# Patient Record
Sex: FEMALE | Employment: UNEMPLOYED | URBAN - METROPOLITAN AREA
[De-identification: names, ages, dates, MRNs, and addresses within clinical notes are randomized per-mention and may not be internally consistent; named-entity substitution may affect disease eponyms.]

---

## 2019-01-16 PROBLEM — Z78.9 LANGUAGE BARRIER: Status: ACTIVE | Noted: 2019-01-16

## 2019-01-16 PROBLEM — Z34.90 PREGNANCY: Status: ACTIVE | Noted: 2019-01-16

## 2019-01-16 PROBLEM — D27.1 DERMOID CYST OF LEFT OVARY: Status: ACTIVE | Noted: 2019-01-16

## 2019-08-26 ENCOUNTER — HOSPITAL ENCOUNTER (INPATIENT)
Age: 24
LOS: 2 days | Discharge: HOME | DRG: 560 | End: 2019-08-28
Attending: OBSTETRICS & GYNECOLOGY | Admitting: OBSTETRICS & GYNECOLOGY
Payer: MEDICAID

## 2019-08-26 ENCOUNTER — ANESTHESIA EVENT (OUTPATIENT)
Dept: LABOR AND DELIVERY | Age: 24
DRG: 560 | End: 2019-08-26
Payer: MEDICAID

## 2019-08-26 ENCOUNTER — ANESTHESIA (OUTPATIENT)
Dept: LABOR AND DELIVERY | Age: 24
DRG: 560 | End: 2019-08-26
Payer: MEDICAID

## 2019-08-26 DIAGNOSIS — Z3A.38 38 WEEKS GESTATION OF PREGNANCY: Primary | ICD-10-CM

## 2019-08-26 PROBLEM — Z37.9 NORMAL LABOR: Status: ACTIVE | Noted: 2019-08-26

## 2019-08-26 PROBLEM — O42.90 AMNIOTIC FLUID LEAKING: Status: ACTIVE | Noted: 2019-08-26

## 2019-08-26 LAB
A1 MICROGLOB PLACENTAL VAG QL: POSITIVE
ABO + RH BLD: NORMAL
ARTERIAL PATENCY WRIST A: ABNORMAL
ARTERIAL PATENCY WRIST A: ABNORMAL
BASE DEFICIT BLD-SCNC: 2 MMOL/L
BASE DEFICIT BLD-SCNC: 3 MMOL/L
BDY SITE: ABNORMAL
BDY SITE: ABNORMAL
BLOOD GROUP ANTIBODIES SERPL: NORMAL
BODY TEMPERATURE: 98.6
BODY TEMPERATURE: 98.6
CO2 BLD-SCNC: 24 MMOL/L
CO2 BLD-SCNC: 26 MMOL/L
COLLECT TIME,HTIME: 1855
COLLECT TIME,HTIME: 1855
CONTROL LINE PRESENT?: NORMAL
ERYTHROCYTE [DISTWIDTH] IN BLOOD BY AUTOMATED COUNT: 15.5 % (ref 11.9–14.6)
EXPIRATION DATE: NORMAL
GAS FLOW.O2 O2 DELIVERY SYS: ABNORMAL L/MIN
GAS FLOW.O2 O2 DELIVERY SYS: ABNORMAL L/MIN
HCO3 BLD-SCNC: 24.7 MMOL/L (ref 22–26)
HCO3 BLDV-SCNC: 22.5 MMOL/L (ref 23–28)
HCT VFR BLD AUTO: 35.3 % (ref 35.8–46.3)
HGB BLD-MCNC: 11.2 G/DL (ref 11.7–15.4)
INTERNAL NEGATIVE CONTROL: NORMAL
KIT LOT NO.: NORMAL
MCH RBC QN AUTO: 26.8 PG (ref 26.1–32.9)
MCHC RBC AUTO-ENTMCNC: 31.7 G/DL (ref 31.4–35)
MCV RBC AUTO: 84.4 FL (ref 79.6–97.8)
NRBC # BLD: 0 K/UL (ref 0–0.2)
PCO2 BLDCO: 39 MMHG (ref 32–68)
PCO2 BLDCO: 48 MMHG (ref 32–68)
PH BLDCO: 7.32 [PH] (ref 7.15–7.38)
PH BLDCO: 7.37 [PH] (ref 7.15–7.38)
PLATELET # BLD AUTO: 349 K/UL (ref 150–450)
PMV BLD AUTO: 10.3 FL (ref 9.4–12.3)
PO2 BLDCO: 19 MMHG
PO2 BLDCO: 30 MMHG
RBC # BLD AUTO: 4.18 M/UL (ref 4.05–5.2)
SAO2 % BLD: 25 % (ref 95–98)
SAO2 % BLDV: 55 % (ref 65–88)
SERVICE CMNT-IMP: ABNORMAL
SERVICE CMNT-IMP: ABNORMAL
SPECIMEN EXP DATE BLD: NORMAL
SPECIMEN TYPE: ABNORMAL
SPECIMEN TYPE: ABNORMAL
WBC # BLD AUTO: 8.4 K/UL (ref 4.3–11.1)

## 2019-08-26 PROCEDURE — 77030034696 HC CATH URETH FOL 2W BARD -A

## 2019-08-26 PROCEDURE — 0UQMXZZ REPAIR VULVA, EXTERNAL APPROACH: ICD-10-PCS | Performed by: OBSTETRICS & GYNECOLOGY

## 2019-08-26 PROCEDURE — 74011250636 HC RX REV CODE- 250/636: Performed by: OBSTETRICS & GYNECOLOGY

## 2019-08-26 PROCEDURE — 59025 FETAL NON-STRESS TEST: CPT

## 2019-08-26 PROCEDURE — 0KQM0ZZ REPAIR PERINEUM MUSCLE, OPEN APPROACH: ICD-10-PCS | Performed by: OBSTETRICS & GYNECOLOGY

## 2019-08-26 PROCEDURE — 76060000078 HC EPIDURAL ANESTHESIA

## 2019-08-26 PROCEDURE — 77030011945 HC CATH URIN INT ST MENT -A

## 2019-08-26 PROCEDURE — 99283 EMERGENCY DEPT VISIT LOW MDM: CPT

## 2019-08-26 PROCEDURE — 74011250637 HC RX REV CODE- 250/637: Performed by: OBSTETRICS & GYNECOLOGY

## 2019-08-26 PROCEDURE — 75410000003 HC RECOV DEL/VAG/CSECN EA 0.5 HR

## 2019-08-26 PROCEDURE — 77030014125 HC TY EPDRL BBMI -B: Performed by: STUDENT IN AN ORGANIZED HEALTH CARE EDUCATION/TRAINING PROGRAM

## 2019-08-26 PROCEDURE — 82803 BLOOD GASES ANY COMBINATION: CPT

## 2019-08-26 PROCEDURE — 77030020255 HC SOL INJ LR 1000ML BG

## 2019-08-26 PROCEDURE — 75410000002 HC LABOR FEE PER 1 HR

## 2019-08-26 PROCEDURE — 86900 BLOOD TYPING SEROLOGIC ABO: CPT

## 2019-08-26 PROCEDURE — 74011250636 HC RX REV CODE- 250/636

## 2019-08-26 PROCEDURE — 84112 EVAL AMNIOTIC FLUID PROTEIN: CPT | Performed by: OBSTETRICS & GYNECOLOGY

## 2019-08-26 PROCEDURE — 77030018846 HC SOL IRR STRL H20 ICUM -A

## 2019-08-26 PROCEDURE — 75410000000 HC DELIVERY VAGINAL/SINGLE

## 2019-08-26 PROCEDURE — 85027 COMPLETE CBC AUTOMATED: CPT

## 2019-08-26 PROCEDURE — 65270000029 HC RM PRIVATE

## 2019-08-26 PROCEDURE — 4A1HXCZ MONITORING OF PRODUCTS OF CONCEPTION, CARDIAC RATE, EXTERNAL APPROACH: ICD-10-PCS | Performed by: OBSTETRICS & GYNECOLOGY

## 2019-08-26 PROCEDURE — A4300 CATH IMPL VASC ACCESS PORTAL: HCPCS | Performed by: STUDENT IN AN ORGANIZED HEALTH CARE EDUCATION/TRAINING PROGRAM

## 2019-08-26 PROCEDURE — 77030003028 HC SUT VCRL J&J -A

## 2019-08-26 RX ORDER — HYDROMORPHONE HYDROCHLORIDE 1 MG/ML
1-2 INJECTION, SOLUTION INTRAMUSCULAR; INTRAVENOUS; SUBCUTANEOUS
Status: DISCONTINUED | OUTPATIENT
Start: 2019-08-26 | End: 2019-08-28 | Stop reason: HOSPADM

## 2019-08-26 RX ORDER — NALOXONE HYDROCHLORIDE 0.4 MG/ML
0.4 INJECTION, SOLUTION INTRAMUSCULAR; INTRAVENOUS; SUBCUTANEOUS AS NEEDED
Status: DISCONTINUED | OUTPATIENT
Start: 2019-08-26 | End: 2019-08-28 | Stop reason: HOSPADM

## 2019-08-26 RX ORDER — HYDROCODONE BITARTRATE AND ACETAMINOPHEN 7.5; 325 MG/1; MG/1
1-2 TABLET ORAL
Status: DISCONTINUED | OUTPATIENT
Start: 2019-08-26 | End: 2019-08-28 | Stop reason: HOSPADM

## 2019-08-26 RX ORDER — OXYTOCIN/RINGER'S LACTATE 30/500 ML
0-25 PLASTIC BAG, INJECTION (ML) INTRAVENOUS
Status: DISCONTINUED | OUTPATIENT
Start: 2019-08-26 | End: 2019-08-26

## 2019-08-26 RX ORDER — SODIUM CHLORIDE 0.9 % (FLUSH) 0.9 %
5-40 SYRINGE (ML) INJECTION AS NEEDED
Status: DISCONTINUED | OUTPATIENT
Start: 2019-08-26 | End: 2019-08-26

## 2019-08-26 RX ORDER — LIDOCAINE HYDROCHLORIDE 10 MG/ML
1 INJECTION INFILTRATION; PERINEURAL
Status: DISCONTINUED | OUTPATIENT
Start: 2019-08-26 | End: 2019-08-26

## 2019-08-26 RX ORDER — ROPIVACAINE HYDROCHLORIDE 2 MG/ML
INJECTION, SOLUTION EPIDURAL; INFILTRATION; PERINEURAL
Status: DISCONTINUED | OUTPATIENT
Start: 2019-08-26 | End: 2019-08-26 | Stop reason: HOSPADM

## 2019-08-26 RX ORDER — LANOLIN ALCOHOL/MO/W.PET/CERES
1 CREAM (GRAM) TOPICAL
Status: DISCONTINUED | OUTPATIENT
Start: 2019-08-27 | End: 2019-08-28 | Stop reason: HOSPADM

## 2019-08-26 RX ORDER — ZOLPIDEM TARTRATE 5 MG/1
5 TABLET ORAL
Status: DISCONTINUED | OUTPATIENT
Start: 2019-08-26 | End: 2019-08-28 | Stop reason: HOSPADM

## 2019-08-26 RX ORDER — SODIUM CHLORIDE 0.9 % (FLUSH) 0.9 %
5-40 SYRINGE (ML) INJECTION EVERY 8 HOURS
Status: DISCONTINUED | OUTPATIENT
Start: 2019-08-26 | End: 2019-08-26

## 2019-08-26 RX ORDER — ROPIVACAINE HYDROCHLORIDE 5 MG/ML
INJECTION, SOLUTION EPIDURAL; INFILTRATION; PERINEURAL AS NEEDED
Status: DISCONTINUED | OUTPATIENT
Start: 2019-08-26 | End: 2019-08-26 | Stop reason: HOSPADM

## 2019-08-26 RX ORDER — LIDOCAINE HYDROCHLORIDE 20 MG/ML
JELLY TOPICAL
Status: DISCONTINUED | OUTPATIENT
Start: 2019-08-26 | End: 2019-08-26

## 2019-08-26 RX ORDER — DEXTROSE, SODIUM CHLORIDE, SODIUM LACTATE, POTASSIUM CHLORIDE, AND CALCIUM CHLORIDE 5; .6; .31; .03; .02 G/100ML; G/100ML; G/100ML; G/100ML; G/100ML
125 INJECTION, SOLUTION INTRAVENOUS CONTINUOUS
Status: DISCONTINUED | OUTPATIENT
Start: 2019-08-26 | End: 2019-08-26

## 2019-08-26 RX ORDER — OXYTOCIN/RINGER'S LACTATE 15/250 ML
250 PLASTIC BAG, INJECTION (ML) INTRAVENOUS ONCE
Status: DISCONTINUED | OUTPATIENT
Start: 2019-08-26 | End: 2019-08-26

## 2019-08-26 RX ORDER — MINERAL OIL
120 OIL (ML) ORAL
Status: COMPLETED | OUTPATIENT
Start: 2019-08-26 | End: 2019-08-26

## 2019-08-26 RX ORDER — DIPHENHYDRAMINE HCL 25 MG
25 CAPSULE ORAL
Status: DISCONTINUED | OUTPATIENT
Start: 2019-08-26 | End: 2019-08-28 | Stop reason: HOSPADM

## 2019-08-26 RX ORDER — BUTORPHANOL TARTRATE 2 MG/ML
1 INJECTION INTRAMUSCULAR; INTRAVENOUS
Status: DISCONTINUED | OUTPATIENT
Start: 2019-08-26 | End: 2019-08-26

## 2019-08-26 RX ORDER — SIMETHICONE 80 MG
80 TABLET,CHEWABLE ORAL
Status: DISCONTINUED | OUTPATIENT
Start: 2019-08-26 | End: 2019-08-28 | Stop reason: HOSPADM

## 2019-08-26 RX ORDER — IBUPROFEN 800 MG/1
800 TABLET ORAL
Status: DISCONTINUED | OUTPATIENT
Start: 2019-08-26 | End: 2019-08-28 | Stop reason: HOSPADM

## 2019-08-26 RX ORDER — METHYLERGONOVINE MALEATE 0.2 MG/1
200 TABLET ORAL EVERY 6 HOURS
Status: COMPLETED | OUTPATIENT
Start: 2019-08-27 | End: 2019-08-27

## 2019-08-26 RX ADMIN — METHYLERGONOVINE MALEATE 200 MCG: 0.2 TABLET ORAL at 23:27

## 2019-08-26 RX ADMIN — IBUPROFEN 800 MG: 800 TABLET, FILM COATED ORAL at 21:35

## 2019-08-26 RX ADMIN — ROPIVACAINE HYDROCHLORIDE 13 ML: 5 INJECTION, SOLUTION EPIDURAL; INFILTRATION; PERINEURAL at 15:12

## 2019-08-26 RX ADMIN — SODIUM CHLORIDE, SODIUM LACTATE, POTASSIUM CHLORIDE, CALCIUM CHLORIDE, AND DEXTROSE MONOHYDRATE 125 ML/HR: 600; 310; 30; 20; 5 INJECTION, SOLUTION INTRAVENOUS at 11:00

## 2019-08-26 RX ADMIN — OXYTOCIN 2 MILLI-UNITS/MIN: 10 INJECTION, SOLUTION INTRAMUSCULAR; INTRAVENOUS at 11:40

## 2019-08-26 RX ADMIN — SODIUM CHLORIDE, SODIUM LACTATE, POTASSIUM CHLORIDE, AND CALCIUM CHLORIDE 500 ML: 600; 310; 30; 20 INJECTION, SOLUTION INTRAVENOUS at 15:32

## 2019-08-26 RX ADMIN — ROPIVACAINE HYDROCHLORIDE 8 ML/HR: 2 INJECTION, SOLUTION EPIDURAL; INFILTRATION; PERINEURAL at 15:19

## 2019-08-26 RX ADMIN — MINERAL OIL 120 ML: 471.95 OIL ORAL at 18:29

## 2019-08-26 RX ADMIN — WITCH HAZEL 1 PAD: 500 SOLUTION RECTAL; TOPICAL at 21:35

## 2019-08-26 NOTE — PROGRESS NOTES
1838 Pushing with pt.   1850 Called out for delivery staff. 1851 perineum  prep completed. 200  female apgars 9&9. Infant skin to skin to mom.    Report given to Vic Naranjo.

## 2019-08-26 NOTE — PROGRESS NOTES
1004 Pt. Arrived for rule out rupture. Pt. States her water broke at 0900. Pt. States fluid was clear. Pt. States she is toño but she does not know how often. EFM and toco applied. Dr. Tania Mancuso called per RN. Amnisure collected. 26 Dr. Tania Mancuso at bedside reviewing FHR tracing. 1027 Amnisure positive. SVE per Dr. Tania Mancuso. 1/50/-2. Admission orders received. 1038 Pt. Transferred to L&D via wheelchair with RN.  Report given to Fred Duvall Rn

## 2019-08-26 NOTE — PROGRESS NOTES
1805 Pitocin decreased by half per MAR. MD at [facility]. Made aware. Strip reviewed.

## 2019-08-26 NOTE — PROGRESS NOTES
Dr. Aditya Arreola at bedside. SVE as documented. FHR and contraction pattern reviewed. Vásquez placed per MD. Continue pitocin.

## 2019-08-26 NOTE — PROGRESS NOTES
Patient seen and examined. Comfortable with epidural.  5/90/-1/cephalic. Somewhat molded head but no swelling. Continue pitocin.

## 2019-08-26 NOTE — PROGRESS NOTES
213 Endeavour Thomas to room. Pt to side of bed.   1507 Anesthesia MD in room. 1508 Time out per MD.   1513 Epidural cath in place. Test dose. Serial BP as documented. 1516 Pt to left hip tilt.

## 2019-08-26 NOTE — ANESTHESIA PROCEDURE NOTES
Epidural Block    Start time: 8/26/2019 3:05 PM  End time: 8/26/2019 3:13 PM  Performed by: Eulalia Dominguez MD  Authorized by: Eulalia Dominguez MD     Pre-Procedure  Indication: at surgeon's request and labor epidural    Preanesthetic Checklist: patient identified, risks and benefits discussed, anesthesia consent, site marked, patient being monitored, timeout performed and anesthesia consent    Timeout Time: 15:08        Epidural:   Patient position:  Seated  Prep region:  Lumbar  Prep: Chlorhexidine    Location:  L3-4    Needle and Epidural Catheter:   Needle Type:  Tuohy  Needle Gauge:  17 G  Injection Technique:  Loss of resistance using saline  Attempts:  1  Catheter Size:  19 G  Depth in Epidural Space (cm):  5  Events: no blood with aspiration, no cerebrospinal fluid with aspiration, no paresthesia and negative aspiration test    Test Dose:  Negative    Assessment:   Catheter Secured:  Tegaderm and tape  Insertion:  Uncomplicated  Patient tolerance:  Patient tolerated the procedure well with no immediate complications

## 2019-08-26 NOTE — ANESTHESIA PREPROCEDURE EVALUATION
Relevant Problems   No relevant active problems       Anesthetic History   No history of anesthetic complications            Review of Systems / Medical History  Patient summary reviewed and pertinent labs reviewed    Pulmonary  Within defined limits                 Neuro/Psych   Within defined limits           Cardiovascular                  Exercise tolerance: >4 METS     GI/Hepatic/Renal  Within defined limits              Endo/Other        Obesity     Other Findings              Physical Exam    Airway  Mallampati: I  TM Distance: 4 - 6 cm  Neck ROM: normal range of motion   Mouth opening: Normal     Cardiovascular  Regular rate and rhythm,  S1 and S2 normal,  no murmur, click, rub, or gallop  Rhythm: regular  Rate: normal         Dental  No notable dental hx       Pulmonary  Breath sounds clear to auscultation               Abdominal  Abdominal exam normal       Other Findings            Anesthetic Plan    ASA: 2  Anesthesia type: epidural            Anesthetic plan and risks discussed with: Patient and Spouse      Via

## 2019-08-26 NOTE — PROGRESS NOTES
Results for Liana Tony (MRN [id number]) as of 8/26/2019 19:28   Ref. Range 8/26/2019 19:18 8/26/2019 19:19   pCO2 cord blood Latest Ref Range: 32 - 68 mmHg 48 39   pO2 cord blood Latest Units: mmHg 19 30   HCO3 (POC) Latest Ref Range: 22 - 26 MMOL/L 24.7    sO2 (POC) Latest Ref Range: 95 - 98 % 25 (L)    Base deficit (POC) Latest Units: mmol/L 2 3   Patient temp.  Latest Units:   98.6 98.6   Specimen type (POC) Latest Units:   ARTERIAL CORD VENOUS CORD   pH, cord blood (POC) Latest Ref Range: 7.15 - 7.38   7.319 7.366   HCO3, venous (POC) Latest Ref Range: 23 - 28 MMOL/L  22.5 (L)   sO2, venous (POC) Latest Ref Range: 65 - 88 %  55 (L)   Site Latest Units:   CORD CORD   Device: Latest Units:   ROOM AIR ROOM AIR   Allens test (POC) Latest Units:   NOT APPLICABLE NOT APPLICABLE

## 2019-08-26 NOTE — H&P
History & Physical    Name: Radha Evans MRN: [id number]  SSN: [id number]    YOB: 1995  Age: 21 y.o. Sex: female        Subjective:     Estimated Date of Delivery: 19  OB History    Para Term  AB Living   1             SAB TAB Ectopic Molar Multiple Live Births                    # Outcome Date GA Lbr Stanley/2nd Weight Sex Delivery Anes PTL Lv   1 Current                Ms. Jessica Petersen is admitted with pregnancy at 38w5d for Presumptive ROM . Prenatal course was normal. Please see prenatal records for details. Active fetal movement, minimal vaginal spotting, rare contractions, ROM at 9 am, clear    Past Medical History:   Diagnosis Date    Dermoid cyst of ovary      History reviewed. No pertinent surgical history. Social History     Occupational History    Not on file   Tobacco Use    Smoking status: Light Tobacco Smoker    Smokeless tobacco: Never Used    Tobacco comment: quit with +UPT   Substance and Sexual Activity    Alcohol use: No     Frequency: Never    Drug use: No    Sexual activity: Yes     Partners: Male     Family History   Problem Relation Age of Onset    No Known Problems Mother     Other Father         brain cancer       No Known Allergies  Prior to Admission medications    Medication Sig Start Date End Date Taking? Authorizing Provider   calcium carbonate (TUMS PO) Take  by mouth. Yes Provider, Historical   loratadine (CLARITIN) 10 mg tablet Take 10 mg by mouth. Yes Provider, Historical   [id number] gee-folic-dha (PRENATAL DHA+COMPLETE PRENATAL) [id number] mg-mcg-mg cmpk Take  by mouth. Yes Provider, Historical        Review of Systems: A comprehensive review of systems was negative except for that written in the HPI.     Objective:     Vitals:  Vitals:    19 1011 19 1012   BP:  119/68   Pulse:  (!) 112   Resp:  17   Temp:  98.5 °F (36.9 °C)   Weight: 78.5 kg (173 lb)    Height: 5' 3\" (1.6 m)         Physical Exam:  Heart: Regular rate and rhythm  Lungs clear  SVE  1 cm / 50% / -2, leaking amniotic fluid evident    Membranes:  Spontaneous Rupture of Membranes; Amniotic Fluid: clear fluid  Fetal Heart Rate: Reactive    Prenatal Labs:   Lab Results   Component Value Date/Time    Rubella, External Immune 01/16/2019    HBsAg, External Negative 01/16/2019    HIV, External Non-Reactive 01/16/2019    RPR, External Non-Reactive 01/16/2019    Gonorrhea, External neg 02/19/2019    Chlamydia, External neg 02/19/2019    ABO,Rh O positive 01/16/2019         Assessment/Plan:     Active Problems:    Normal labor (8/26/2019)      Amniotic fluid leaking (8/26/2019)      38 weeks gestation of pregnancy (8/26/2019)         Plan: Admit for Labor  Progressing normally. Group B Strep was negative.   Rare contractions will initiate Pitocin, Dr. Huang Bird

## 2019-08-26 NOTE — L&D DELIVERY NOTE
Delivery Summary    Patient: Jeferson Bryant MRN: [id number]  SSN: [id number]    YOB: 1995  Age: 21 y.o. Sex: female       Information for the patient's :  Adelene Serum [[id number]]       Labor Events:    Labor: No    Steroids: None   Cervical Ripening Date/Time:       Cervical Ripening Type: None   Antibiotics During Labor:     Rupture Identifier:      Rupture Date/Time: 2019 9:00 AM   Rupture Type: SROM   Amniotic Fluid Volume: Moderate    Amniotic Fluid Description: Clear    Amniotic Fluid Odor: None    Induction: None       Induction Date/Time:        Indications for Induction:      Augmentation: Oxytocin   Augmentation Date/Time: 7:44 AM   Indications for Augmentation: Ineffective Contraction Pattern   Labor complications: None       Additional complications:        Delivery Events:  Indications For Episiotomy:     Episiotomy:     Perineal Laceration(s): 2nd   Repaired: Yes   Periurethral Laceration Location: right    Repaired: Yes   Labial Laceration Location:     Repaired:     Sulcal Laceration Location:     Repaired:     Vaginal Laceration Location:     Repaired:     Cervical Laceration Location:     Repaired:     Repair Suture: Vicryl 3-0;Vicryl 2-0   Number of Repair Packets: 2   Estimated Blood Loss (ml):  ml     Delivery Date: 2019    Delivery Time: 6:55 PM  Delivery Type: Vaginal, Spontaneous  Sex:  Female    Gestational Age: 44w7d   Delivery Clinician:  Yamileth Wilson  Living Status: Living   Delivery Location: L&D 426          APGARS  One minute Five minutes Ten minutes   Skin color: 1   1        Heart rate: 2   2        Grimace: 2   2        Muscle tone: 2   2        Breathin   2        Totals: 9   9            Presentation: Vertex    Position: Left Occiput Anterior  Resuscitation Method:  Suctioning-bulb; Tactile Stimulation     Meconium Stained: None      Cord Information: 3 Vessels  Complications: None  Cord around: Delayed cord clamping? Yes  Cord clamped date/time:2019  6:57 PM  Disposition of Cord Blood: Lab    Blood Gases Sent?: Yes    Placenta:  Date/Time:    Removal: Expressed      Appearance: Normal;Intact     [place] Measurements:  Birth Weight:        Birth Length:        Head Circumference:        Chest Circumference:       Abdominal Girth: Other Providers:   JOSH REHMAN;NORMAN ROJAS;KARI LORENZANA;LAKE DAVE;EVERETT ALSTON;IRWIN MORENO, Obstetrician;Primary Nurse;Primary [place] Nurse; Anesthesiologist;Crna;Scrub Tech           Group B Strep: No results found for: GRBSEXT, GRBSEXT  Information for the patient's :  Adelene Serum [[id number]]   No results found for: ABORH, PCTABR, PCTDIG, BILI, ABORHEXT, ABORH    No results for input(s): PCO2CB, PO2CB, HCO3I, SO2I, IBD, PTEMPI, SPECTI, PHICB, ISITE, IDEV, IALLEN in the last 72 hours. Head of the infant delivered over an intact perineum, oropharynx and nares were bulb suctioned. The remainder of the infant delivered without difficulty. The cord was cross clamped and divided and the infant handed to awaiting personnel. Cord blood was then obtained for pH and  studies. The placenta then delivered spontaneously, intact with firm fundus and minimal lochia appreciated. Right sided periurethral laceration repaired with 3-0 vicryl with ravenel catheter in urethra without difficulty.   2nd degree post ML lac repaired with 2-0/3-0 vicryl with excellent cosmesis and hemostasis

## 2019-08-26 NOTE — PROGRESS NOTES
To request an Kazakh  after 4:30 p.m., please call Hermann at [phone]. Thank you,      Melba Licona, [address] [facility] 151 /  [email or web address] c: [phone] / [address] / [place], [address] [facility]  [email or web address]

## 2019-08-27 LAB
BASOPHILS # BLD: 0 K/UL (ref 0–0.2)
BASOPHILS NFR BLD: 0 % (ref 0–2)
DIFFERENTIAL METHOD BLD: ABNORMAL
EOSINOPHIL # BLD: 0.1 K/UL (ref 0–0.8)
EOSINOPHIL NFR BLD: 1 % (ref 0.5–7.8)
ERYTHROCYTE [DISTWIDTH] IN BLOOD BY AUTOMATED COUNT: 15.8 % (ref 11.9–14.6)
HCT VFR BLD AUTO: 35.8 % (ref 35.8–46.3)
HGB BLD-MCNC: 11.4 G/DL (ref 11.7–15.4)
IMM GRANULOCYTES # BLD AUTO: 0.2 K/UL (ref 0–0.5)
IMM GRANULOCYTES NFR BLD AUTO: 2 % (ref 0–5)
LYMPHOCYTES # BLD: 2.8 K/UL (ref 0.5–4.6)
LYMPHOCYTES NFR BLD: 24 % (ref 13–44)
MCH RBC QN AUTO: 27 PG (ref 26.1–32.9)
MCHC RBC AUTO-ENTMCNC: 31.8 G/DL (ref 31.4–35)
MCV RBC AUTO: 84.6 FL (ref 79.6–97.8)
MONOCYTES # BLD: 0.8 K/UL (ref 0.1–1.3)
MONOCYTES NFR BLD: 6 % (ref 4–12)
NEUTS SEG # BLD: 7.9 K/UL (ref 1.7–8.2)
NEUTS SEG NFR BLD: 67 % (ref 43–78)
NRBC # BLD: 0 K/UL (ref 0–0.2)
PLATELET # BLD AUTO: 267 K/UL (ref 150–450)
PMV BLD AUTO: 10 FL (ref 9.4–12.3)
RBC # BLD AUTO: 4.23 M/UL (ref 4.05–5.2)
WBC # BLD AUTO: 11.8 K/UL (ref 4.3–11.1)

## 2019-08-27 PROCEDURE — 65270000029 HC RM PRIVATE

## 2019-08-27 PROCEDURE — 36415 COLL VENOUS BLD VENIPUNCTURE: CPT

## 2019-08-27 PROCEDURE — 85025 COMPLETE CBC W/AUTO DIFF WBC: CPT

## 2019-08-27 PROCEDURE — 74011250637 HC RX REV CODE- 250/637: Performed by: OBSTETRICS & GYNECOLOGY

## 2019-08-27 RX ADMIN — METHYLERGONOVINE MALEATE 200 MCG: 0.2 TABLET ORAL at 18:08

## 2019-08-27 RX ADMIN — IBUPROFEN 800 MG: 800 TABLET, FILM COATED ORAL at 15:17

## 2019-08-27 RX ADMIN — HYDROCODONE BITARTRATE AND ACETAMINOPHEN 1 TABLET: 7.5; 325 TABLET ORAL at 10:41

## 2019-08-27 RX ADMIN — HYDROCODONE BITARTRATE AND ACETAMINOPHEN 1 TABLET: 7.5; 325 TABLET ORAL at 06:13

## 2019-08-27 RX ADMIN — METHYLERGONOVINE MALEATE 200 MCG: 0.2 TABLET ORAL at 06:13

## 2019-08-27 RX ADMIN — HYDROCODONE BITARTRATE AND ACETAMINOPHEN 1 TABLET: 7.5; 325 TABLET ORAL at 18:08

## 2019-08-27 RX ADMIN — IBUPROFEN 800 MG: 800 TABLET, FILM COATED ORAL at 03:38

## 2019-08-27 RX ADMIN — IBUPROFEN 800 MG: 800 TABLET, FILM COATED ORAL at 21:33

## 2019-08-27 RX ADMIN — IBUPROFEN 800 MG: 800 TABLET, FILM COATED ORAL at 09:18

## 2019-08-27 RX ADMIN — METHYLERGONOVINE MALEATE 200 MCG: 0.2 TABLET ORAL at 11:48

## 2019-08-27 RX ADMIN — HYDROCODONE BITARTRATE AND ACETAMINOPHEN 1 TABLET: 7.5; 325 TABLET ORAL at 14:38

## 2019-08-27 RX ADMIN — HYDROCODONE BITARTRATE AND ACETAMINOPHEN 1 TABLET: 7.5; 325 TABLET ORAL at 01:30

## 2019-08-27 RX ADMIN — FERROUS SULFATE TAB 325 MG (65 MG ELEMENTAL FE) 325 MG: 325 (65 FE) TAB at 08:28

## 2019-08-27 NOTE — PROGRESS NOTES
SBAR OUT Report: Mother    Verbal report given to MIRANDA Wade RN (full name & credentials) on this patient, who is now being transferred to MIU (unit) for routine progression of care. The patient is not wearing a green \"Anesthesia-Duramorph\" band. Report consisted of patient's Situation, Background, Assessment and Recommendations (SBAR).  ID bands were compared with the identification form, and verified with the patient and receiving nurse. Information from the SBAR, Kardex, Procedure Summary, Intake/Output, MAR, Accordion, Recent Results and Med Rec Status and the [place] Report was reviewed with the receiving nurse; opportunity for questions and clarification provided.

## 2019-08-27 NOTE — PROGRESS NOTES
Dr. Jeffrey Aylaa at bedside and made aware of pt c/o dizziness and light headedness when ambulating at times. MD voiced understanding. No new orders received.

## 2019-08-27 NOTE — PROGRESS NOTES
Shift assessment complete see flowsheet. Offered an , however pt and FOB declined. Pt can speak english but noted to have  translate at times. Discussed today plan of care with pt and FOB. Pt states she does get dizzy/light headed at times when ambulating to bathroom, pt denies dizziness or light headedness at this time. Informed pt if she feels dizzy or light headed upon standing she needs to call for assistance to help her get up, pt voiced understanding. Questions encouraged and answered. Pt to call with needs/concerns. Pt in bed with call light in reach. No s/s of distress noted.

## 2019-08-27 NOTE — ROUTINE PROCESS
SBAR IN Report: Mother    Verbal report received from Asif Tavarez RN on this patient, who is now being transferred from L&D for routine progression of care. The patient is not wearing a green \"Anesthesia-Duramorph\" band. Report consisted of patient's Situation, Background, Assessment and Recommendations (SBAR). [place] ID bands were compared with the identification form, and verified with the patient and transferring nurse. Information from the SBAR, Procedure Summary, Intake/Output, MAR and Recent Results and the [place] Report was reviewed with the transferring nurse; opportunity for questions and clarification provided.

## 2019-08-27 NOTE — PROGRESS NOTES
To request an Estonian  after 4:30 p.m., please call Hermann at [phone]. Thank you,      Rona Mcallister, [address] [facility] 151 /  [email or web address] c: [phone] / [address] [facility] [address] / [place], [address]   [email or web address]

## 2019-08-27 NOTE — LACTATION NOTE
This note was copied from a baby's chart. Encouraged parents to awake baby for feeding at this time. Educated parents that baby should feed at least every 3hr and if she doesn't wake on her own, to unswaddled baby ,check diaper and attempt feeding. Parents voiced understanding.

## 2019-08-27 NOTE — PROGRESS NOTES
Pt declines . Admission assessment complete as noted. Patient oriented to room and unit. Plan of care reviewed and patient verbalizes understanding. Questions encouraged and answered. Patent encouraged to call for needs or concerns. Safety Teaching reviewed:   1. Hand hygiene prior to handling the infant. 2. Bracelets with matching numbers are placed on mother and infant  3. An infant security tag  [facility]) is placed on the infant's ankle and monitored  4. All OB nurses wear pink Employee badges - do not give your baby to anyone without proper identification. 5. Never leave the baby alone in the room. 6. The infant should be placed on their back to sleep. on a firm mattress. No toys should be placed in the crib. (safe sleep video offered to view)  7. Never shake the baby (video offered to view)  8. Infant fall prevention - do not sleep with the baby, and place the baby in the crib while ambulating.

## 2019-08-27 NOTE — PROGRESS NOTES
Call placed to lab to inquire about CBC being drawn this morning, spoke with Darrius Dillon, will have some come and draw labs now.

## 2019-08-27 NOTE — LACTATION NOTE
In to see mom and infant for the first time. Mom was attempting to latch infant when I walked into room. Infant was very sleepy. Dad in room acting as  as mom has refused to use an . Offered to undress infant and then assist with latch. Infant showed some cues. Placed infant to mom's left breast in the cross cradle hold. Infant latched and took a few sucks and fell asleep. We attempted again and mom stated that it was hurting like infant was biting her. Allowed infant to suck on my gloved finger and she did bite and sucked very little. Informed mom that we could give her until after she is 25 hours old or we could go ahead and start her pumping. Mom wants to wait until after 24 hours. Reviewed with mom and dad the expectations of the first 24 hours as well as the second night. Mom to call out if infant wakes prior to 1900.

## 2019-08-27 NOTE — PROGRESS NOTES
Chart reviewed- first time parents. Primary language is Danish, but no  was required.  met with family and provided education on [facility] Home Visit Program.  Family declined referral for home visit. Family states that they have a strong support system of local family members/friends. Per VIVEK, patient's medicaid is inactive. A [facility] representative has already met with family to provide assistance with reactivating Medicaid. EPDS provided in Danish. Instructions provided on how to complete questionnaire. Family denied any additional needs from  at this time.     OTIS Uribe  [facility]   [phone]

## 2019-08-27 NOTE — LACTATION NOTE
In to follow up with mom and infant. Infant still sleeping and not interested in nursing. Offered to start mom pumping and she agreed. Instructed mom and dad on how to use the breast pump and the pump kit. Mom pumped in initiation phase and expressed 5 ml colostrum. I instructed dad on how to feed infant using the curve tip syringe while infant sucked on his finger. Infant woke and started to suck. Reviewed how to clean the pump parts and syringe. Instructed mom to offer the breast every three hours unless infant woek up cueing before. If no latch to pump and feed infant expressed colostrum. Also reviewed second night of life. Lactation consultant will follow up tomorrow.

## 2019-08-27 NOTE — PROGRESS NOTES
Patient up to bathroom with one person assistance assistance. Voided 300 cc without difficulty. Ifeoma-care taught and completed. Questions encouraged and answered. Patient back to bed, encouraged to call for needs or concerns. Verbalizes understanding.

## 2019-08-27 NOTE — PROGRESS NOTES
Post-Partum Day Number 1 Progress Note    Patient doing well post-partum without significant complaint. Voiding without difficulty, normal lochia. Vitals:    Patient Vitals for the past 8 hrs:   BP Temp Pulse Resp SpO2   19 0815 103/63 98.6 °F (37 °C) 70 16 98 %     Temp (24hrs), Av.3 °F (36.8 °C), Min:97.5 °F (36.4 °C), Max:98.9 °F (37.2 °C)      Vital signs stable, afebrile. Exam:  Patient without distress. Abdomen soft, fundus firm at level of umbilicus, nontender               Perineum with normal lochia noted. Lower extremities are negative for swelling, cords or tenderness. Lab/Data Review:  CBC:   Lab Results   Component Value Date/Time    WBC 11.8 (H) 2019 08:07 AM    HGB 11.4 (L) 2019 08:07 AM    HCT 35.8 2019 08:07 AM     2019 08:07 AM       Assessment and Plan:  Patient appears to be having uncomplicated post-partum course. Continue routine perineal care and maternal education. Plan discharge tomorrow if no problems occur.

## 2019-08-27 NOTE — PROGRESS NOTES
08/27/19 0918   Pain Assessment   Pain Scale 1 Numeric (0 - 10)   Pain Intensity 1 2   Pain Location 1 Abdomen   Pain Orientation 1 Lower   Pain Description 1 Sore   Pain Intervention(s) 1 Medication (see MAR)   Vital Signs   Level of Consciousness Alert

## 2019-08-27 NOTE — LACTATION NOTE

## 2019-08-27 NOTE — PROGRESS NOTES
Report received from Lisa Orona RN care assumed. Pt breastfeeding at this time with call light in reach.

## 2019-08-27 NOTE — ANESTHESIA POSTPROCEDURE EVALUATION
* No procedures listed *.    epidural    Anesthesia Post Evaluation      Multimodal analgesia: multimodal analgesia used between 6 hours prior to anesthesia start to PACU discharge  Patient location during evaluation: bedside  Patient participation: complete - patient participated  Level of consciousness: awake  Pain management: adequate  Airway patency: patent  Anesthetic complications: no  Cardiovascular status: acceptable and stable  Respiratory status: acceptable and room air  Hydration status: acceptable  Post anesthesia nausea and vomiting:  none      No vitals data found for the desired time range.

## 2019-08-28 VITALS
TEMPERATURE: 97.8 F | WEIGHT: 173 LBS | SYSTOLIC BLOOD PRESSURE: 107 MMHG | DIASTOLIC BLOOD PRESSURE: 60 MMHG | BODY MASS INDEX: 30.65 KG/M2 | HEIGHT: 63 IN | OXYGEN SATURATION: 98 % | HEART RATE: 77 BPM | RESPIRATION RATE: 16 BRPM

## 2019-08-28 PROCEDURE — 90715 TDAP VACCINE 7 YRS/> IM: CPT | Performed by: OBSTETRICS & GYNECOLOGY

## 2019-08-28 PROCEDURE — 74011250636 HC RX REV CODE- 250/636: Performed by: OBSTETRICS & GYNECOLOGY

## 2019-08-28 PROCEDURE — 74011250637 HC RX REV CODE- 250/637: Performed by: OBSTETRICS & GYNECOLOGY

## 2019-08-28 RX ORDER — HYDROCODONE BITARTRATE AND ACETAMINOPHEN 7.5; 325 MG/1; MG/1
1-2 TABLET ORAL
Qty: 10 TAB | Refills: 0 | Status: SHIPPED | OUTPATIENT
Start: 2019-08-28 | End: 2019-08-31

## 2019-08-28 RX ORDER — IBUPROFEN 800 MG/1
800 TABLET ORAL
Qty: 60 TAB | Refills: 0 | Status: SHIPPED | OUTPATIENT
Start: 2019-08-28

## 2019-08-28 RX ADMIN — IBUPROFEN 800 MG: 800 TABLET, FILM COATED ORAL at 13:02

## 2019-08-28 RX ADMIN — FERROUS SULFATE TAB 325 MG (65 MG ELEMENTAL FE) 325 MG: 325 (65 FE) TAB at 13:02

## 2019-08-28 RX ADMIN — IBUPROFEN 800 MG: 800 TABLET, FILM COATED ORAL at 06:15

## 2019-08-28 RX ADMIN — HYDROCODONE BITARTRATE AND ACETAMINOPHEN 1 TABLET: 7.5; 325 TABLET ORAL at 01:46

## 2019-08-28 RX ADMIN — TETANUS TOXOID, REDUCED DIPHTHERIA TOXOID AND ACELLULAR PERTUSSIS VACCINE, ADSORBED 0.5 ML: 5; 2.5; 8; 8; 2.5 SUSPENSION INTRAMUSCULAR at 13:03

## 2019-08-28 NOTE — PROGRESS NOTES
Teaching for self care reviewed with patient and significant other. Discharge instructions reviewed and E-signed. Copy given to pt. Prescription given with information. Reviewed follow up appointment for self. Questions encouraged and answered. Identification verified with mom and infant bands and signed. Instructed to call when ready for discharge.

## 2019-08-28 NOTE — PROGRESS NOTES
Post-Partum Day Number 2 Progress/Discharge Note    Patient doing well post-partum without significant complaint. Voiding without difficulty, normal lochia, positive flatus. Vitals:    Patient Vitals for the past 8 hrs:   BP Temp Pulse Resp SpO2   19 0646 107/60 97.8 °F (36.6 °C) 77 16 98 %     Temp (24hrs), Av.8 °F (36.6 °C), Min:97.7 °F (36.5 °C), Max:97.8 °F (36.6 °C)      Vital signs stable, afebrile. Exam:  Patient without distress. Abdomen soft, fundus firm at level of umbilicus, non tender               Perineum with normal lochia noted. Lower extremities are negative for swelling, cords or tenderness. Lab/Data Review:  CBC: No results found for: WBC, HGB, HGBEXT, HCT, HCTEXT, PLT, PLTEXT, HGBEXT, HCTEXT, PLTEXT    Assessment and Plan:  Patient appears to be having uncomplicated post-partum course. Continue routine perineal care and maternal education. Plan discharge for today with follow up in our office in 6 weeks.

## 2019-08-28 NOTE — LACTATION NOTE
In to see mom and infant prior to discharge to home. Mom stated that infant started latching and nursing during the night as she was more alert. Reviewed discharge information to include engorgement. Mom does not have a personal breast pump at home but she plans to purchase a breastpump. Did review renting a breast pump as an option. Encouraged mom to follow up with our outpatient lactation consultant as needed.

## 2019-08-28 NOTE — PROGRESS NOTES
Bedside report received from [facility], RN. Patient care assumed.

## 2019-08-28 NOTE — DISCHARGE SUMMARY
Obstetrical Discharge Summary     Name: Dora Scott MRN: [id number]  SSN: [id number]    YOB: 1995  Age: 21 y.o. Sex: female      Allergies: Patient has no known allergies. Admit Date: 2019    Discharge Date: 2019     Admitting Physician: Troy Khan MD     Attending Physician:  Osvaldo Olvera MD     * Admission Diagnoses: Normal labor [O80, Z37.9]; Amniotic fluid leaking [O42.90];38 weeks gestation of pregnancy [Z3A.38];38 weeks gestation of pregnancy [Z3A.38]; Amniotic fluid leaking [O42.90]    * Discharge Diagnoses:   Information for the patient's :  Nishi Stafford [[id number]]   Delivery of a 7 lb 3.9 oz (3.285 kg) female infant via Vaginal, Spontaneous on 2019 at 6:55 PM  by Adelaida Mata. Apgars were 9  and 9 . Additional Diagnoses:   Hospital Problems as of 2019 Date Reviewed: 2019          Codes Class Noted - Resolved POA    * (Principal) Normal labor ICD-10-CM: O80, Z37.9  ICD-9-CM: 036  2019 - Present Unknown        Amniotic fluid leaking ICD-10-CM: O42.90  ICD-9-CM: 658.10  2019 - Present Unknown        38 weeks gestation of pregnancy ICD-10-CM: Z3A.38  ICD-9-CM: V22.2  2019 - Present Unknown             Lab Results   Component Value Date/Time    ABO/Rh(D) O POSITIVE 2019 11:04 AM    Rubella, External Immune 2019    ABO,Rh O positive 2019      There is no immunization history on file for this patient. * Procedures:   * No surgery found *           * Discharge Condition: [facility] Course: Normal hospital course following the delivery. * Disposition: Home    Discharge Medications:   Current Discharge Medication List      START taking these medications    Details   ibuprofen (MOTRIN) 800 mg tablet Take 1 Tab by mouth every six (6) hours as needed for Pain.   Qty: 60 Tab, Refills: 0      HYDROcodone-acetaminophen (NORCO) 7.5-325 mg per tablet Take 1-2 Tabs by mouth every four (4) hours as needed for Pain for up to 3 days. Max Daily Amount: 12 Tabs. Qty: 10 Tab, Refills: 0    Associated Diagnoses: 38 weeks gestation of pregnancy         CONTINUE these medications which have NOT CHANGED    Details   calcium carbonate (TUMS PO) Take  by mouth.      loratadine (CLARITIN) 10 mg tablet Take 10 mg by mouth.      [id number] gee-folic-dha (PRENATAL DHA+COMPLETE PRENATAL) -300 mg-mcg-mg cmpk Take  by mouth. * Follow-up Care/Patient Instructions:   Activity: No sex for 6 weeks  263}    Follow-up Information     Follow up With Specialties Details Why Contact Info    None    None (395) Patient stated that they have no PCP

## 2019-08-28 NOTE — DISCHARGE INSTRUCTIONS
Discharge instruction to follow: Activity: Pelvis rest for 6 weeks     No heavy lifting over 15 lbs for 2 weeks     No driving for 2 weeks     No push/pull motion such as sweeping or vacuuming for 2 weeks     No tub baths for 6 weeks    If using sitz bath continue until comfortable stopping. If using basilia-bottle continue to use until comfortable stopping. Change sanitary pad after each urination or bowel movement. Call MD for the following:      Fever over 101 F; pain not relieved by medication; foul smelling vaginal discharge or an increase in vaginal bleeding. Take medication as prescribed. Follow up with MD as order. Patient Education        Vaginal Childbirth: Care Instructions  Your Care Instructions    Your body will slowly heal in the next few weeks. It is easy to get too tired and overwhelmed during the first weeks after your baby is born. Changes in your hormones can shift your mood without warning. You may find it hard to meet the extra demands on your energy and time. Take it easy on yourself. Follow-up care is a key part of your treatment and safety. Be sure to make and go to all appointments, and call your doctor if you are having problems. It's also a good idea to know your test results and keep a list of the medicines you take. How can you care for yourself at home? · Vaginal bleeding and cramps  ? After delivery, you will have a bloody discharge from the vagina. This will turn pink within a week and then white or yellow after about 10 days. It may last for 2 to 4 weeks or longer, until the uterus has healed. Use pads instead of tampons until you stop bleeding. ? Do not worry if you pass some blood clots, as long as they are smaller than a golf ball. If you have a tear or stitches in your vaginal area, change the pad at least every 4 hours to prevent soreness and infection. ? You may have cramps for the first few days after childbirth.  These are normal and occur as the uterus shrinks to normal size. Take an over-the-counter pain medicine, such as acetaminophen (Tylenol), ibuprofen (Advil, Motrin), or naproxen (Aleve), for cramps. Read and follow all instructions on the label. Do not take aspirin, because it can cause more bleeding. ? Do not take two or more pain medicines at the same time unless the doctor told you to. Many pain medicines have acetaminophen, which is Tylenol. Too much acetaminophen (Tylenol) can be harmful. · Stitches  ? If you have stitches, they will dissolve on their own and do not need to be removed. Follow your doctor's instructions for cleaning the stitched area. ? Put ice or a cold pack on your painful area for 10 to 20 minutes at a time, several times a day, for the first few days. Put a thin cloth between the ice and your skin. ? Sit in a few inches of warm water (sitz bath) 3 times a day and after bowel movements. The warm water helps with pain and itching. If you do not have a tub, a warm shower might help. · Breast fullness  ? Your breasts may overfill (engorge) in the first few days after delivery. To help milk flow and to relieve pain, warm your breasts in the shower or by using warm, moist towels before nursing. ? If you are not nursing, do not put warmth on your breasts or touch your breasts. Wear a tight bra or sports bra and use ice until the fullness goes away. This usually takes 2 to 3 days. ? Put ice or a cold pack on your breast after nursing to reduce swelling and pain. Put a thin cloth between the ice and your skin. · Activity  ? Eat a balanced diet. Do not try to lose weight by cutting calories. Keep taking your prenatal vitamins, or take a multivitamin. ? Get as much rest as you can. Try to take naps when your baby sleeps during the day. ? Get some exercise every day. But do not do any heavy exercise until your doctor says it is okay. ? Wait until you are healed (about 4 to 6 weeks) before you have sexual intercourse.  Your doctor will tell you when it is okay to have sex. ? Talk to your doctor about birth control. You can get pregnant even before your period returns. Also, you can get pregnant while you are breastfeeding. · Mental health  ? It is normal to have some sadness, anxiety, sleeplessness, and mood swings after you go home. If you feel upset or hopeless for more than a few days or are having trouble doing the things you need to do, talk to your doctor. · Constipation and hemorrhoids  ? Drink plenty of fluids, enough so that your urine is light yellow or clear like water. If you have kidney, heart, or liver disease and have to limit fluids, talk with your doctor before you increase the amount of fluids you drink. ? Eat plenty of fiber each day. Have a bran muffin or bran cereal for breakfast, and try eating a piece of fruit for a mid-afternoon snack. ? For painful, itchy hemorrhoids, put ice or a cold pack on the area several times a day for 10 minutes at a time. Follow this by putting a warm compress on the area for another 10 to 20 minutes or by sitting in a shallow, warm bath. When should you call for help? [id number] anytime you think you may need emergency care. For example, call if:    · You have thoughts of harming yourself, your baby, or another person.     · You passed out (lost consciousness).     · You have chest pain, are short of breath, or cough up blood.     · You have a seizure.    Call your doctor now or seek immediate medical care if:    · You have severe vaginal bleeding.     · You are dizzy or lightheaded, or you feel like you may faint.     · You have a fever.     · You have new or more pain in your belly or pelvis.     · You have symptoms of a blood clot in your leg (called a deep vein thrombosis), such as:  ? Pain in the calf, back of the knee, thigh, or groin. ? Redness and swelling in your leg or groin.     · You have signs of preeclampsia, such as:  ? Sudden swelling of your face, hands, or feet.   ? New vision problems (such as dimness, blurring, or seeing spots). ? A severe headache.    Watch closely for changes in your health, and be sure to contact your doctor if:    · Your vaginal bleeding seems to be getting heavier.     · You have new or worse vaginal discharge.     · You feel sad, anxious, or hopeless for more than a few days.     · You do not get better as expected. Where can you learn more? Go to http://zachary-awilda.info/. Enter B692 in the search box to learn more about \"Vaginal Childbirth: Care Instructions. \"  Current as of: 2018  Content Version: 12.1  © 7448-6653 [employer]. Care instructions adapted under license by [employer] (which disclaims liability or warranty for this information). If you have questions about a medical condition or this instruction, always ask your healthcare professional. [place] [address] any warranty or liability for your use of this information. Patient Education        Postpartum: Care Instructions  Your Care Instructions  After childbirth (postpartum period), your body goes through many changes. Some of these changes happen over several weeks. In the hours after delivery, your body will begin to recover from childbirth while it prepares to breastfeed your . You may feel emotional during this time. Your hormones can shift your mood without warning for no clear reason. In the first couple of weeks after childbirth, many women have emotions that change from happy to sad. You may find it hard to sleep. You may cry a lot. This is called the \"baby blues. \" These overwhelming emotions often go away within a couple of days or weeks. But it's important to discuss your feelings with your doctor. It is easy to get too tired and overwhelmed during the first weeks after childbirth. Don't try to do too much. Get rest whenever you can, accept help from others, and eat well and drink plenty of fluids.   In the first couple of weeks after giving birth, your doctor or midwife may want to check in with you and make a plan for any follow-up care you may need. You will likely have a complete postpartum visit in the first 3 months after delivery. At that time, your doctor or midwife will check on your recovery from childbirth. He or she will also see how you are doing with your emotions and talk about your concerns or questions. Follow-up care is a key part of your treatment and safety. Be sure to make and go to all appointments, and call your doctor if you are having problems. It's also a good idea to know your test results and keep a list of the medicines you take. How can you care for yourself at home? · Sleep or rest when your baby sleeps. · Get help with household chores from family or friends, if you can. Do not try to do it all yourself. · If you have hemorrhoids or swelling or pain around the opening of your vagina, try using cold and heat. You can put ice or a cold pack on the area for 10 to 20 minutes at a time. Put a thin cloth between the ice and your skin. Also try sitting in a few inches of warm water (sitz bath) 3 times a day and after bowel movements. · Take pain medicines exactly as directed. ? If the doctor gave you a prescription medicine for pain, take it as prescribed. ? If you are not taking a prescription pain medicine, ask your doctor if you can take an over-the-counter medicine. · Eat more fiber to avoid constipation. Include foods such as whole-grain breads and cereals, raw vegetables, raw and dried fruits, and beans. · Drink plenty of fluids, enough so that your urine is light yellow or clear like water. If you have kidney, heart, or liver disease and have to limit fluids, talk with your doctor before you increase the amount of fluids you drink. · Do not rinse inside your vagina with fluids (douche).   · If you have stitches, keep the area clean by pouring or spraying warm water over the area outside your vagina and anus after you use the toilet. · Keep a list of questions to ask your doctor or midwife. Your questions might be about:  ? Changes in your breasts, such as lumps or soreness. ? When to expect your menstrual period to start again. ? What form of birth control is best for you. ? Weight you have put on during the pregnancy. ? Exercise options. ? What foods and drinks are best for you, especially if you are breastfeeding. ? Problems you might be having with breastfeeding. ? When you can have sex. Some women may want to talk about lubricants for the vagina. ? Any feelings of sadness or restlessness that you are having. When should you call for help? [id number] anytime you think you may need emergency care. For example, call if:    · You have thoughts of harming yourself, your baby, or another person.     · You passed out (lost consciousness).     · You have chest pain, are short of breath, or cough up blood.     · You have a seizure.    Call your doctor now or seek immediate medical care if:    · Your vaginal bleeding seems to be getting heavier.     · You are dizzy or lightheaded, or you feel like you may faint.     · You have a fever.     · You have new or more belly pain.     · You have symptoms of a blood clot in your leg (called a deep vein thrombosis), such as:  ? Pain in the calf, back of the knee, thigh, or groin. ? Redness and swelling in your leg or groin.     · You have signs of preeclampsia, such as:  ? Sudden swelling of your face, hands, or feet. ? New vision problems (such as dimness, blurring, or seeing spots). ? A severe headache.    Watch closely for changes in your health, and be sure to contact your doctor if:    · You have new or worse vaginal discharge.     · You feel sad or depressed.     · You are having problems with your breasts or breastfeeding. Where can you learn more? Go to http://zachary-awilda.info/.   Enter T751 in the search box to learn more about \"Postpartum: Care Instructions. \"  Current as of: September 5, 2018  Content Version: 12.1  © 5188-1008 [employer]. Care instructions adapted under license by [employer] (which disclaims liability or warranty for this information). If you have questions about a medical condition or this instruction, always ask your healthcare professional. [address] any warranty or liability for your use of this information. Patient Education       COCO PEDRAZA MDQFISZ: MARCUS Andrews  [ Postpartum: Care Instructions ]  ÅÑÔÇÏÇÊ ÇáÑÚÇíÉ  Char Tank YJFMS (COCO Fry ÈÚÏ IKFLXTW)¡ íãÑ ÌÓãßö ÈÚÏÏ ãä ÇáÊÛíÑÇÊ. æÊÍÏË ÈÚÖ åÐå ÇáÊÛíÑÇÊ Úáì ãÏÇÑ ÚÏÉ ÃÓÇÈíÚ. Ýí ÇáÓÇÚÇÊ ÇáÊÇáíÉ ááæáÇÏÉ¡ íÈÏÃ ÌÓãßö Ýí ÇÓÊÑÏÇÏ ÚÇÝíÊå ÈÚÏ æáÇÏÉ ÇáØÝá æíßæä ãÄåáÇð áÅÑÖÇÚ ØÝáß ÍÏíË WQTTIVE ãä ÇáËÏí. ÞÏ ÊÔÚÑíä RICIZHOGR VSBB åÐÇ ÇáæÞÊ. ÞÏ ÊõÛíøöÑ EMMLVGVUY ÍÇáÊßö ÇáãÒÇÌíÉ ÈÏæä ÓÇÈÞ ÅäÐÇÑ áÓÈÈ ÛíÑ æÇÖÍ. æÝí ÇáÃÓÈæÚíä ÇáÃæáíä ÈÚÏ ÇáæáÇÏÉ¡ ÊÊÛíÑ ÚæÇØÝ ãÚÙã ÇáÓíÏÇÊ QBESXLSEHKO ãä ÇáÓÚÇÏÉ Åáì ÇáÍÒä. æÞÏ ÊÌÏíä ÕÚæÈÉ Ýí Çáäæã. æÞÏ ÊÈßíä ßËíÑðÇ. æåæ ãÇ íõØáÞ Úáíå \"ÇßÊÆÇÈ ãÇ ÈÚÏ ÇáæáÇÏÉ\". æÚÇÏÉð ãÇ ÊÎÊÝí åÐå UYVBLOJKNP ÎáÇá íæãíä Ãæ ÃÓÈæÚíä. æáßä ãä Çáãåã ãäÇÞÔÉ ãÔÇÚÑßö ãÚ ØÈíÈßö. íÓåá Ãä ÊÕÇÈí ÈÊÚÈ æÅÑåÇÞ ÔÏíÏíä ÎáÇá ÇáÃÓÇÈíÚ ÇáÃæáì ÈÚÏ ÇáæáÇÏÉ. áÇ ÊÍÇæáí ÇáÞíÇã PGBKEU ßËíÑÉ. Catherine Mike æÞÊãÇ íãßäß æÇÞÈáí ÇáãÓÇÚÏÉ ãä ÇáÂÎÑíä æÊäÇæáí ÇáØÚÇã ÌíÏðÇ æÇÔÑÈí ßãíÉ æÝíÑÉ ãä ÇáÓæÇÆá. æÈÚÏ ãíáÇÏ ØÝáß ÈÃÑÈÚÉ Åáì ÓÊÉ ÃÓÇÈíÚ ÊÞÑíÈðÇ¡ ÓÊÞæãíä ÈÒíÇÑÉ ØÈíÈßö ááãÊÇÈÚÉ. æåÐå ÇáÒíÇÑÉ åí æÞÊßö ÇáãÎÕÕ ááÊÍÏË Åáì ØÈíÈßö Úä Ãí ÔíÁ íÔÛáß Ãæ íßæä áÏíßö ÝÖæá ÈÔÃäå. ÊõÚÏ ãÊÇÈÚÉ ÇáÑÚÇíÉ ÌÒÁðÇ Chales Haver ÚáÇÌß æÓáÇãÊß. áÐÇ¡ ÇÍÑÕí Úáì ÊÑÊíÈ ãæÇÚíÏ ÒíÇÑÉ ÇáØÈíÈ HXQDQHSZH ÈåÇ ÌãíÚðÇ RTLRAAWU ÈØÈíÈß ÅÐÇ ßäÊö ÊÚÇäíä ãä ãÔßáÇÊ. æãä ÇáÌíÏ ßÐáß ãÚÑÝÉ äÊÇÆÌ DWOKSEQE PLREXCDML ÈÞÇÆãÉ ÇáÃÏæíÉ ÇáÊí OPXXXBNOYD. ßíÝ íãßäßö ÇáÇÚÊäÇÁ ÈäÝÓß Ýí XKAXXS¿   · äÇãí Ãæ ÇÓÊÑíÍí ÚäÏãÇ íäÇã ØÝáßö.    · Lesle Antonito - ÅÐÇ ßÇä EZVIUNYD - INVHPDV Ãæ ÇáÃÕÏÞÇÁ Ýí XVLWZBL YNXBUVEX. áÇ ÊÍÇæáí ÇáÞíÇã ÈÌãíÚ NCJNEUE ÇáãäÒáíÉ ÈäÝÓß.  · ÅÐÇ ßäÊö ãÕÇÈÉ ÈÇáÈæÇÓíÑ Ãæ ÈÊæÑã Ãæ Ãáã Íæá ÝÊÍÉ LTBIJG¡ ÝÍÇæáí ÇÓÊÎÏÇã ÇáÈÑæÏÉ æÇáÓÎæäÉ. íãßäßö æÖÚ ËáÌ Ãæ ßãÇÏÉ ÈÇÑÏÉ Úáì ÇáãäØÞÉ ÇáãáÊåÈÉ áãÏÉ ÊÊÑÇæÍ ãä 10 ÏÞÇÆÞ Åáì 20 ÏÞíÞÉ Ýí ÇáãÑÉ ÇáæÇÍÏÉ. Bismarck Pelletier ÞãÇÔ ÑÞíÞÉ Èíä ÇáËáÌ æÌáÏßö. æÃíÖðÇ ÍÇæáí ÇáÌáæÓ Úáì ÈõÚÏ ÈæÕÇÊ ÞáíáÉ ãä ÍãÇã ãÇÆí ÏÇÝÆ (ÍãÇã ÓíÊÒ) 3 ãÑÇÊ íæãíðÇ æÈÚÏ ÇáÊÈÑÒ.  · ÊäÇæáí ÃÏæíÉ ÊÓßíä ÇáÃáã ÈÏÞÉ æÝÞðÇ áÅÑÔÇÏÇÊ ÇáØÈíÈ. o o ÅÐÇ ÃÚØÇßö ÇáØÈíÈ ÏæÇÁð ãä ÇáÃÏæíÉ ÇáÊí ÊõÕÑÝ ÈæÕÝÉ ØÈíÉ áÚáÇÌ ÇáÃáã¡ ÝÊäÇæáíå æÝÞ ÅÑÔÇÏÇÊå. o o ÅÐÇ áã ÊÃÎÐí ÏæÇÁð áÚáÇÌ ÇáÃáã ãä ÇáÃÏæíÉ ÇáÊí ÊõÕÑÝ ÈæÕÝÉ ØÈíÉ¡ ÝÓáöí ØÈíÈß ÅÐÇ ßÇä ÈÅãßÇäß ÃÎÐ ÏæÇÁ ãä ÇáÃÏæíÉ ÇáÊí ÊõÕÑÝ ÈÏæä æÕÝÉ ØÈíÉ.  · ÊäÇæáí ßãíÇÊ ÅÖÇÝíÉ ãä ÇáÃáíÇÝ áÊÌäÈ ÇáÅãÓÇß. æáíßä Öãä ãÇ QGMCLGFNY ãä ÃØÚãÉ ÇáÎÈÒ ÇáãÕäæÚ ãä AGDCQX TEQHBZR MZIZFHH WBWJMUSHD ÇáäíÆÉ JKPBXCEH ÇáäíÆÉ æÇáãÌÝÝÉ OFPTHGDVBH.  · ÇÔÑÈí ßãíÉ æÝíÑÉ ãä CDPVRMV ÈãÇ íßÝí áÃä íÕÈÍ áæä Èæáß ÃÕÝÑ UEWEEE Ãæ ÕÇÝíðÇ ãËá ÇáãÇÁ. ÅÐÇ ßäÊö ãÕÇÈÉ ÈÃãÑÇÖ Çáßõáì Ãæ ÇáÞáÈ Ãæ ÇáßÈÏ æÚáíß Ãä ÊáÊÒãí ÈÊÞáíá BUILSAJ¡ ÝÊÍÏËí ãÚ ØÈíÈß ÞÈá Ãä ÊÞæãí ÈÒíÇÏÉ ßãíÉ YQCOVBD ÇáÊí ÊÔÑÈíäåÇ.  · áÇ ÊÔØÝí ãåÈáßö ãä BPNSET KDSNEYWW ÇáÓæÇÆá (ÇáÏÔ ÇáãåÈáí).  · ÚäÏ æÌæÏ ÎíÇØÉ ÌÑÇÍíÉ¡ ÝÍÇÝÙí Úáì äÙÇÝÉ ÇáãäØÞÉ ÈÕÈ ãÇÁ ÏÇÝÆ Ãæ ÈÎå Úáì ÇáãäØÞÉ ÇáæÇÞÚÉ ÎÇÑÌ IDSVTF æÝÊÍÉ ÇáÔÑÌ ÈÚÏ ÇÓÊÎÏÇã NBRNYPO.  · ÇÍÊÝÙí ÈÞÇÆãÉ FDULBBVJ áÇÕØÍÇÈåÇ ãÚßö Åáì ÇáÒíÇÑÉ ÇáÊí ÓÊÞæãíä ÈåÇ ááØÈíÈ ÈÚÏ ÇáæáÇÏÉ. æÇáÊí ÞÏ Êßæä Íæá:   o o ÇáÊÛíÑÇÊ ÇáÊí ÊÍÏË Ýí ÇáËÏí ãËá HENYFTXD ÇááÍãíÉ Ãæ ÇáæÎÒ. o o ãæÚÏ ÊæÞÚ ÈÏÇíÉ ÇáÏæÑÉ ÇáÔåÑíÉ ÇáÊÇáíÉ. o o ÃÝÖá ÃÔßÇá ÊäÙíã ÇáäÓá ÈÇáäÓÈÉ áßö. o o ÇáæÒä ÇáÐí íõÖÇÝ Úáì æÒäß ÃËäÇÁ ÇáÍãá. o o ÎíÇÑÇÊ ÇáÊãÇÑíä. o o ÃÝÖá EYNRAAXGY ICUDGNSK SODXMMI áßö¡ ÎÇÕÉ ÅÐÇ ßäÊ ÊÑÖÚíä ØÝáß ÑÖÇÚÉ ØÈíÚíÉ. o o UGOPVQIY ÇáÊí ÞÏ ÊæÇÌåíäåÇ ãÚ ÇáÑÖÇÚÉ ÇáØÈíÚíÉ. o o ÃæÞÇÊ ÇáãÚÇÔÑÉ ÇáÌäÓíÉ. ÞÏ ÊÑÛÈ ÈÚÖ ÇáäÓÇÁ Ýí ZOLOAX Úä LOAZXTEXBF AOAWLSTV (ãÎÝÝÇÊ MHCEMSDW). o o ÝÖáÇð Úä ãÔÇÚÑ ÇáÍÒä Ãæ SOPZFKS ÇáÊí ÊÚÇäíä ãäåÇ.   ãÊì íÌÈ GQEBNII áØáÈ ÇáãÓÇÚÏÉ¿  ÇÊÕáí ÈÇáÑÞã 911 Ýí Ãí æÞÊ æÞÊ ÊÑíä Ýíå ÍÇÌÊß Åáì ÑÚÇíÉ ØÇÑÆÉ. Úáì ÓÈíá OIRRJJ¡ ÇÊÕá ÅÐÇ:   · ÊæáÏÊ áÏíß ÃÝßÇÑ ÈÇáÅÖÑÇÑ ÈäÝÓß Ãæ ÈØÝáß Ãæ ÈÔÎÕ ÂÎÑ.  · ÝÞÏÇä ÇáæÚí. ÇÊÕáí ÈØÈíÈß ÇáÂä Ãæ ÇØáÈí ÇáÑÚÇíÉ ÇáØÈíÉ ÇáÝæÑíÉ Ýí ÇáÍÇáÇÊ ÇáÊÇáíÉ:   · ÒíÇÏÉ ßËÇÝÉ ÇáäÒíÝ ÇáãåÈáí.  · Ãæ ÇáÔÚæÑ ILDZVYL Ãæ ÇáÏæÎÉ Ãæ ÇáÔÚæÑ LRIZUYO ÇáÅÛãÇÁ.  · ÅÕÇÈÊß ÈÍãì. ÑÇÞÈí ÈÔÏÉ Ãí ÊÛííÑÇÊ ÊØÑÃ Úáì ÕÍÊß¡ æÇÍÑÕí Úáì FYZMYYF ÈÇáØÈíÈ Ýí DQMPOUE ÇáÊÇáíÉ:   · ÅÕÇÈÊß ÈÅÝÑÇÒÇÊ ÌÏíÏÉ Ýí ÇáãåÈá Ãæ ÒíÇÏÉ GOIIGSRCQ ÇáãæÌæÏÉ ÈÇáÝÚá.  · ÔÚæÑßö WOBLCN Ãæ ÇáßÂÈÉ.  · ßÇä áÏíßö ãÔßáÇÊ Ýí ÇáËÏí Ãæ ÇáÑÖÇÚÉ ÇáØÈíÚíÉ. Ãíä íãßä ãÚÑÝÉ ÇáãÒíÏ¿  ÇäÊÞÇá Åáì [email or web address]  ÏÎæá Nolin.Joliet íãßäß ãÚÑÝÉ ÇáãÒíÏ ãä ÎáÇá ãÑÈÚ ÇáÈÍË \"ÝÊÑÉ ãÇ ÈÚÏ ÇáæáÇÏÉ: ÅÑÔÇÏÇÊ ÇáÑÚÇíÉ - [ Postpartum: Care Instructions ]. \"  © 1731-5595 [employer], [place]. ÊãÊ ÊåíÆÉ ÅÑÔÇÏÇÊ ÇáÚäÇíÉ ÈãæÌÈ ÊÑÎíÕ ãä ãÎÊÕ ÇáÑÚÇíÉ ÇáÕÍíÉ áÏíß. ÅÐÇ ßÇäÊ áÏíß ÃíÉ CGVJROFHB Úä ÍÇáÉ ØÈíÉ Ãæ Ãí ãä åÐå XFWCQAMDZ¡ ÝÊæÌå ÏæãðÇ OPSQVRL Åáì ãÎÊÕ ÇáÑÚÇíÉ ÇáÕÍíÉ. ÊäÝí ãäÙãÉ Myre[employer] [place] ÖãÇä Ãæ Tracy Barton ZLEGBMW åÐå DOSQXCLOO.   ÅÕÏÇÑ ÇáãÍÊæì: 12.1 ãÍÏøË OEBXCVDH ãä: 25 SI [id number]

## 2019-08-28 NOTE — PROGRESS NOTES
EPDS score = 11. Patient discharged prior to EPDS being entered into computer. Patient was provided with resources on  mood disorder symptoms/resources on 19. OB notified of EPDS score via fax.  will follow-up with patient due to score.     TOMMIE Rizzo-GERDA  [facility]   [phone]

## 2019-09-16 ENCOUNTER — TELEPHONE (OUTPATIENT)
Dept: CASE MANAGEMENT | Age: 24
End: 2019-09-16

## 2019-09-16 NOTE — TELEPHONE ENCOUNTER
Phone call to patient at [phone]. Patient  answered the phone as patient was unavailable.  reports that patient had \"a little bit\" of depression/anxiety \"in the beginning, but she's better. \"  Per , \"she's wearing makeup now. ..does that tell you anything? \"       denied any needs from  at this time.     OTIS Travis  [facility]   [phone]

## 2019-10-30 ENCOUNTER — TELEPHONE (OUTPATIENT)
Dept: CASE MANAGEMENT | Age: 24
End: 2019-10-30

## 2019-10-30 NOTE — TELEPHONE ENCOUNTER
Phone call to patient at [phone]. Patient  answered the phone as patient was unavailable.  reports that patient is doing well with no depression/anxiety.    denied any needs from  at this time.  [address] [place] [address]   [phone]